# Patient Record
Sex: FEMALE | Race: WHITE | NOT HISPANIC OR LATINO | URBAN - METROPOLITAN AREA
[De-identification: names, ages, dates, MRNs, and addresses within clinical notes are randomized per-mention and may not be internally consistent; named-entity substitution may affect disease eponyms.]

---

## 2021-10-13 ENCOUNTER — SPOT (OUTPATIENT)
Dept: URBAN - METROPOLITAN AREA CLINIC 38 | Facility: CLINIC | Age: 86
Setting detail: DERMATOLOGY
End: 2021-10-13

## 2021-10-13 DIAGNOSIS — D23.5 OTHER BENIGN NEOPLASM OF SKIN OF TRUNK: ICD-10-CM

## 2021-10-13 DIAGNOSIS — L82.1 OTHER SEBORRHEIC KERATOSIS: ICD-10-CM

## 2021-10-13 DIAGNOSIS — L57.8 OTHER SKIN CHANGES DUE TO CHRONIC EXPOSURE TO NONIONIZING RADIATION: ICD-10-CM

## 2021-10-13 PROBLEM — L82.0 INFLAMED SEBORRHEIC KERATOSIS: Status: RESOLVED | Noted: 2021-10-13

## 2021-10-13 PROBLEM — F42.4 EXCORIATION (SKIN-PICKING) DISORDER: Status: RESOLVED | Noted: 2021-10-13

## 2021-10-13 PROCEDURE — 99203 OFFICE O/P NEW LOW 30 MIN: CPT

## 2021-10-13 PROCEDURE — 17110 DESTRUCT B9 LESION 1-14: CPT

## 2022-01-20 ENCOUNTER — SPOT (OUTPATIENT)
Dept: URBAN - METROPOLITAN AREA CLINIC 38 | Facility: CLINIC | Age: 87
Setting detail: DERMATOLOGY
End: 2022-01-20

## 2022-01-20 DIAGNOSIS — L57.0 ACTINIC KERATOSIS: ICD-10-CM

## 2022-01-20 PROBLEM — L28.0 LICHEN SIMPLEX CHRONICUS: Status: RESOLVED | Noted: 2022-01-20

## 2022-01-20 PROBLEM — L82.0 INFLAMED SEBORRHEIC KERATOSIS: Status: RESOLVED | Noted: 2022-01-20

## 2022-01-20 PROBLEM — L82.1 OTHER SEBORRHEIC KERATOSIS: Status: RESOLVED | Noted: 2022-01-20

## 2022-01-20 PROCEDURE — 17110 DESTRUCT B9 LESION 1-14: CPT

## 2022-01-20 PROCEDURE — 99213 OFFICE O/P EST LOW 20 MIN: CPT

## 2024-08-26 ENCOUNTER — NURSING HOME VISIT (OUTPATIENT)
Dept: POST ACUTE CARE | Facility: EXTERNAL LOCATION | Age: 89
End: 2024-08-26

## 2024-08-26 DIAGNOSIS — F02.80 ALZHEIMER DISEASE (MULTI): Primary | ICD-10-CM

## 2024-08-26 DIAGNOSIS — G30.9 ALZHEIMER DISEASE (MULTI): Primary | ICD-10-CM

## 2024-08-26 NOTE — LETTER
Patient: Nitza Barton  : 6/10/1932    Encounter Date: 2024    Pt was seen in the NH.  89 yo f with PMH alzhermers disease depression hypothyroidism HTN CKD here for long term care  Pt is in usual state , no complaint  General appearance: Comfortable, no distress  ROS: No SOB  Medications reviewed  Head: Normal  Neck: Soft  Heart: Regular  Lungs: Clear  Abdomen: soft    Plan:   1)comfort care  2) To continue lorazepam 0.5 mg q 2 h prn    Problem List Items Addressed This Visit       Alzheimer disease (Multi) - Primary          Electronically Signed By: Johnathan Delatorre MD   24  5:17 PM

## 2024-08-26 NOTE — PROGRESS NOTES
Pt was seen in the NH.  89 yo f with PMH alzhermers disease depression hypothyroidism HTN CKD here for long term care  Pt is in usual state , no complaint  General appearance: Comfortable, no distress  ROS: No SOB  Medications reviewed  Head: Normal  Neck: Soft  Heart: Regular  Lungs: Clear  Abdomen: soft    Plan:   1)comfort care  2) To continue lorazepam 0.5 mg q 2 h prn    Problem List Items Addressed This Visit       Alzheimer disease (Multi) - Primary

## 2024-09-25 ENCOUNTER — NURSING HOME VISIT (OUTPATIENT)
Dept: POST ACUTE CARE | Facility: EXTERNAL LOCATION | Age: 89
End: 2024-09-25

## 2024-09-25 DIAGNOSIS — E03.9 HYPOTHYROIDISM, UNSPECIFIED TYPE: Primary | ICD-10-CM

## 2024-09-25 NOTE — LETTER
Patient: Nitza Barton  : 6/10/1932    Encounter Date: 2024    Pt was seen in the NH.  Pt is in usual state , no complaint  General appearance: Comfortable, no distress  ROS: No SOB  Medications reviewed  Head: Normal  Neck: Soft  Heart: Regular  Lungs: Clear  Abdomen: soft    Plan:   1)clinically doing fine  2) To continue levothyroxine 175 mcg daily    Problem List Items Addressed This Visit       Hypothyroidism - Primary          Electronically Signed By: Johnathan Delatorre MD   24  8:56 PM

## 2024-09-26 NOTE — PROGRESS NOTES
Pt was seen in the NH.  Pt is in usual state , no complaint  General appearance: Comfortable, no distress  ROS: No SOB  Medications reviewed  Head: Normal  Neck: Soft  Heart: Regular  Lungs: Clear  Abdomen: soft    Plan:   1)clinically doing fine  2) To continue levothyroxine 175 mcg daily    Problem List Items Addressed This Visit       Hypothyroidism - Primary

## 2024-10-25 ENCOUNTER — LAB REQUISITION (OUTPATIENT)
Dept: LAB | Facility: HOSPITAL | Age: 89
End: 2024-10-25
Payer: MEDICARE

## 2024-10-25 DIAGNOSIS — E03.9 HYPOTHYROIDISM, UNSPECIFIED: ICD-10-CM

## 2024-10-25 LAB
T4 FREE SERPL-MCNC: 1.03 NG/DL (ref 0.61–1.12)
TSH SERPL-ACNC: 29.35 MIU/L (ref 0.44–3.98)

## 2024-10-25 PROCEDURE — 36415 COLL VENOUS BLD VENIPUNCTURE: CPT | Mod: OUT | Performed by: INTERNAL MEDICINE

## 2024-10-25 PROCEDURE — 84439 ASSAY OF FREE THYROXINE: CPT | Mod: OUT | Performed by: INTERNAL MEDICINE

## 2024-10-25 PROCEDURE — 84443 ASSAY THYROID STIM HORMONE: CPT | Mod: OUT | Performed by: INTERNAL MEDICINE

## 2024-10-28 ENCOUNTER — NURSING HOME VISIT (OUTPATIENT)
Dept: POST ACUTE CARE | Facility: EXTERNAL LOCATION | Age: 89
End: 2024-10-28
Payer: MEDICARE

## 2024-10-28 DIAGNOSIS — I10 HYPERTENSION, UNSPECIFIED TYPE: Primary | ICD-10-CM

## 2024-10-28 PROCEDURE — 99308 SBSQ NF CARE LOW MDM 20: CPT | Performed by: INTERNAL MEDICINE

## 2024-11-25 ENCOUNTER — NURSING HOME VISIT (OUTPATIENT)
Dept: POST ACUTE CARE | Facility: EXTERNAL LOCATION | Age: 89
End: 2024-11-25
Payer: MEDICARE

## 2024-11-25 DIAGNOSIS — G30.9 ALZHEIMER DISEASE (MULTI): Primary | ICD-10-CM

## 2024-11-25 DIAGNOSIS — F02.80 ALZHEIMER DISEASE (MULTI): Primary | ICD-10-CM

## 2024-11-25 PROCEDURE — 99308 SBSQ NF CARE LOW MDM 20: CPT | Performed by: INTERNAL MEDICINE

## 2024-11-25 NOTE — PROGRESS NOTES
Pt was seen in the NH.  Pt is in usual state , no complaint  General appearance: Comfortable, no distress  ROS: No SOB  Medications reviewed  Head: Normal  Neck: Soft  Heart: Regular  Lungs: Clear  Abdomen: soft    Plan:   1)clinically doing fine  2) To continue seroquel 100 mg daily    Problem List Items Addressed This Visit       Alzheimer disease (Multi) - Primary

## 2024-11-25 NOTE — LETTER
Patient: Nitza Barton  : 6/10/1932    Encounter Date: 2024    Pt was seen in the NH.  Pt is in usual state , no complaint  General appearance: Comfortable, no distress  ROS: No SOB  Medications reviewed  Head: Normal  Neck: Soft  Heart: Regular  Lungs: Clear  Abdomen: soft    Plan:   1)clinically doing fine  2) To continue seroquel 100 mg daily    Problem List Items Addressed This Visit       Alzheimer disease (Multi) - Primary          Electronically Signed By: Johnathan Delatorre MD   24  4:09 PM

## 2024-12-10 ENCOUNTER — NURSING HOME VISIT (OUTPATIENT)
Dept: POST ACUTE CARE | Facility: EXTERNAL LOCATION | Age: 89
End: 2024-12-10
Payer: MEDICARE

## 2024-12-10 DIAGNOSIS — E03.9 HYPOTHYROIDISM, UNSPECIFIED TYPE: Primary | ICD-10-CM

## 2024-12-10 PROCEDURE — 99308 SBSQ NF CARE LOW MDM 20: CPT | Performed by: INTERNAL MEDICINE

## 2024-12-10 NOTE — LETTER
Patient: Nitza Barton  : 6/10/1932    Encounter Date: 12/10/2024    Pt was seen in the NH.  Pt is in usual state , no complaint  General appearance: Comfortable, no distress  ROS: No SOB  Medications reviewed  Head: Normal  Neck: Soft  Heart: Regular  Lungs: Clear  Abdomen: soft    Plan:   1)clinically doing fine  2) To continue levothyroxine 175 mcg daily    Problem List Items Addressed This Visit       Hypothyroidism - Primary          Electronically Signed By: Johnathan Delatorre MD   12/10/24  7:32 PM

## 2024-12-31 ENCOUNTER — LAB REQUISITION (OUTPATIENT)
Dept: LAB | Facility: HOSPITAL | Age: 89
End: 2024-12-31
Payer: MEDICARE

## 2024-12-31 DIAGNOSIS — I12.9 HYPERTENSIVE CHRONIC KIDNEY DISEASE WITH STAGE 1 THROUGH STAGE 4 CHRONIC KIDNEY DISEASE, OR UNSPECIFIED CHRONIC KIDNEY DISEASE: ICD-10-CM

## 2024-12-31 DIAGNOSIS — E03.9 HYPOTHYROIDISM, UNSPECIFIED: ICD-10-CM

## 2024-12-31 LAB
T4 FREE SERPL-MCNC: 0.77 NG/DL (ref 0.61–1.12)
TSH SERPL-ACNC: 9.62 MIU/L (ref 0.44–3.98)

## 2024-12-31 PROCEDURE — 84439 ASSAY OF FREE THYROXINE: CPT | Mod: OUT | Performed by: INTERNAL MEDICINE

## 2024-12-31 PROCEDURE — 84443 ASSAY THYROID STIM HORMONE: CPT | Mod: OUT | Performed by: INTERNAL MEDICINE

## 2024-12-31 PROCEDURE — 36415 COLL VENOUS BLD VENIPUNCTURE: CPT | Mod: OUT | Performed by: INTERNAL MEDICINE

## 2025-01-10 ENCOUNTER — LAB REQUISITION (OUTPATIENT)
Dept: LAB | Facility: HOSPITAL | Age: OVER 89
End: 2025-01-10
Payer: MEDICARE

## 2025-01-10 DIAGNOSIS — E03.9 HYPOTHYROIDISM, UNSPECIFIED: ICD-10-CM

## 2025-01-10 LAB
T4 FREE SERPL-MCNC: 1.04 NG/DL (ref 0.61–1.12)
TSH SERPL-ACNC: 13.46 MIU/L (ref 0.44–3.98)

## 2025-01-10 PROCEDURE — 36415 COLL VENOUS BLD VENIPUNCTURE: CPT | Mod: OUT | Performed by: INTERNAL MEDICINE

## 2025-01-10 PROCEDURE — 84443 ASSAY THYROID STIM HORMONE: CPT | Mod: OUT | Performed by: INTERNAL MEDICINE

## 2025-01-10 PROCEDURE — 84439 ASSAY OF FREE THYROXINE: CPT | Mod: OUT | Performed by: INTERNAL MEDICINE

## 2025-01-27 ENCOUNTER — NURSING HOME VISIT (OUTPATIENT)
Dept: POST ACUTE CARE | Facility: EXTERNAL LOCATION | Age: OVER 89
End: 2025-01-27
Payer: MEDICARE

## 2025-01-27 DIAGNOSIS — F02.80 ALZHEIMER DISEASE (MULTI): ICD-10-CM

## 2025-01-27 DIAGNOSIS — G30.9 ALZHEIMER DISEASE (MULTI): ICD-10-CM

## 2025-01-27 PROCEDURE — 99308 SBSQ NF CARE LOW MDM 20: CPT | Performed by: INTERNAL MEDICINE

## 2025-01-27 NOTE — PROGRESS NOTES
Pt was seen in the NH.  Pt is in usual state , no complaint  General appearance: Comfortable, no distress  ROS: No SOB  Medications reviewed  Head: Normal  Neck: Soft  Heart: Regular  Lungs: Clear  Abdomen: soft    Plan:   1)clinically doing fine  2) To continue seroquel 100 mg daily    Problem List Items Addressed This Visit       Alzheimer disease (Multi)

## 2025-01-27 NOTE — LETTER
Patient: Nitza Barton  : 6/10/1932    Encounter Date: 2025    Pt was seen in the NH.  Pt is in usual state , no complaint  General appearance: Comfortable, no distress  ROS: No SOB  Medications reviewed  Head: Normal  Neck: Soft  Heart: Regular  Lungs: Clear  Abdomen: soft    Plan:   1)clinically doing fine  2) To continue seroquel 100 mg daily    Problem List Items Addressed This Visit       Alzheimer disease (Multi)          Electronically Signed By: Johnathan Delatorre MD   25  5:28 PM

## 2025-01-28 ENCOUNTER — LAB REQUISITION (OUTPATIENT)
Dept: LAB | Facility: HOSPITAL | Age: OVER 89
End: 2025-01-28
Payer: MEDICARE

## 2025-01-28 DIAGNOSIS — E03.9 HYPOTHYROIDISM, UNSPECIFIED: ICD-10-CM

## 2025-01-28 LAB
T4 FREE SERPL-MCNC: 0.99 NG/DL (ref 0.61–1.12)
TSH SERPL-ACNC: 4.03 MIU/L (ref 0.44–3.98)

## 2025-01-28 PROCEDURE — 84443 ASSAY THYROID STIM HORMONE: CPT | Mod: OUT | Performed by: INTERNAL MEDICINE

## 2025-01-28 PROCEDURE — 36415 COLL VENOUS BLD VENIPUNCTURE: CPT | Mod: OUT | Performed by: INTERNAL MEDICINE

## 2025-01-28 PROCEDURE — 84439 ASSAY OF FREE THYROXINE: CPT | Mod: OUT | Performed by: INTERNAL MEDICINE

## 2025-02-21 ENCOUNTER — LAB REQUISITION (OUTPATIENT)
Dept: LAB | Facility: HOSPITAL | Age: OVER 89
End: 2025-02-21
Payer: MEDICARE

## 2025-02-21 DIAGNOSIS — E03.9 HYPOTHYROIDISM, UNSPECIFIED: ICD-10-CM

## 2025-02-21 DIAGNOSIS — F41.9 ANXIETY DISORDER, UNSPECIFIED: ICD-10-CM

## 2025-02-21 LAB
T4 FREE SERPL-MCNC: 1.02 NG/DL (ref 0.61–1.12)
TSH SERPL-ACNC: 4.13 MIU/L (ref 0.44–3.98)

## 2025-02-21 PROCEDURE — 36415 COLL VENOUS BLD VENIPUNCTURE: CPT | Mod: OUT | Performed by: INTERNAL MEDICINE

## 2025-02-21 PROCEDURE — 84439 ASSAY OF FREE THYROXINE: CPT | Mod: OUT | Performed by: INTERNAL MEDICINE

## 2025-02-21 PROCEDURE — 84443 ASSAY THYROID STIM HORMONE: CPT | Mod: OUT | Performed by: INTERNAL MEDICINE

## 2025-02-24 ENCOUNTER — NURSING HOME VISIT (OUTPATIENT)
Dept: POST ACUTE CARE | Facility: EXTERNAL LOCATION | Age: OVER 89
End: 2025-02-24
Payer: MEDICARE

## 2025-02-24 DIAGNOSIS — E03.9 HYPOTHYROIDISM, UNSPECIFIED TYPE: Primary | ICD-10-CM

## 2025-02-24 PROCEDURE — 99308 SBSQ NF CARE LOW MDM 20: CPT | Performed by: INTERNAL MEDICINE

## 2025-02-24 NOTE — LETTER
Patient: Nitza Barton  : 6/10/1932    Encounter Date: 2025    Pt was seen in the NH.  Pt is in usual state , no complaint  General appearance: Comfortable, no distress  ROS: No SOB  Medications reviewed  Head: Normal  Neck: Soft  Heart: Regular  Lungs: Clear  Abdomen: soft    Plan:   1)clinically doing fine  2) To continue levothyroxine 175 mcg daily    Problem List Items Addressed This Visit       Hypothyroidism - Primary          Electronically Signed By: Johnathan Delatorre MD   25  4:44 PM

## 2025-03-12 ENCOUNTER — LAB REQUISITION (OUTPATIENT)
Dept: LAB | Facility: HOSPITAL | Age: OVER 89
End: 2025-03-12
Payer: MEDICARE

## 2025-03-12 DIAGNOSIS — E03.9 HYPOTHYROIDISM, UNSPECIFIED: ICD-10-CM

## 2025-03-12 LAB
T4 FREE SERPL-MCNC: 1.22 NG/DL (ref 0.61–1.12)
TSH SERPL-ACNC: 1.45 MIU/L (ref 0.44–3.98)

## 2025-03-12 PROCEDURE — 84443 ASSAY THYROID STIM HORMONE: CPT | Mod: OUT | Performed by: INTERNAL MEDICINE

## 2025-03-12 PROCEDURE — 36415 COLL VENOUS BLD VENIPUNCTURE: CPT | Mod: OUT | Performed by: INTERNAL MEDICINE

## 2025-03-12 PROCEDURE — 84439 ASSAY OF FREE THYROXINE: CPT | Mod: OUT | Performed by: INTERNAL MEDICINE

## 2025-03-31 ENCOUNTER — NURSING HOME VISIT (OUTPATIENT)
Dept: POST ACUTE CARE | Facility: EXTERNAL LOCATION | Age: OVER 89
End: 2025-03-31
Payer: MEDICARE

## 2025-03-31 DIAGNOSIS — I10 HYPERTENSION, UNSPECIFIED TYPE: Primary | ICD-10-CM

## 2025-03-31 PROCEDURE — 99308 SBSQ NF CARE LOW MDM 20: CPT | Performed by: INTERNAL MEDICINE

## 2025-03-31 NOTE — PROGRESS NOTES
Pt was seen in the NH.  Pt is in usual state , no complaint  General appearance: Comfortable, no distress  ROS: No SOB  Medications reviewed  Head: Normal  Neck: Soft  Heart: Regular  Lungs: Clear  Abdomen: soft    Plan:   1)clinically doing fine  2) To continue lisinopril 5 mg daily    Problem List Items Addressed This Visit       Hypertension - Primary

## 2025-03-31 NOTE — LETTER
Patient: Nitza Barton  : 6/10/1932    Encounter Date: 2025    Pt was seen in the NH.  Pt is in usual state , no complaint  General appearance: Comfortable, no distress  ROS: No SOB  Medications reviewed  Head: Normal  Neck: Soft  Heart: Regular  Lungs: Clear  Abdomen: soft    Plan:   1)clinically doing fine  2) To continue lisinopril 5 mg daily    Problem List Items Addressed This Visit       Hypertension - Primary          Electronically Signed By: Johnathan Delatorre MD   3/31/25  3:56 PM

## 2025-04-04 ENCOUNTER — LAB REQUISITION (OUTPATIENT)
Dept: LAB | Facility: HOSPITAL | Age: OVER 89
End: 2025-04-04
Payer: MEDICARE

## 2025-04-04 DIAGNOSIS — E03.9 HYPOTHYROIDISM, UNSPECIFIED: ICD-10-CM

## 2025-04-04 LAB
T4 FREE SERPL-MCNC: 1.19 NG/DL (ref 0.61–1.12)
TSH SERPL-ACNC: 1.19 MIU/L (ref 0.44–3.98)

## 2025-04-04 PROCEDURE — 84439 ASSAY OF FREE THYROXINE: CPT | Mod: OUT | Performed by: INTERNAL MEDICINE

## 2025-04-04 PROCEDURE — 36415 COLL VENOUS BLD VENIPUNCTURE: CPT | Mod: OUT | Performed by: INTERNAL MEDICINE

## 2025-04-04 PROCEDURE — 84443 ASSAY THYROID STIM HORMONE: CPT | Mod: OUT | Performed by: INTERNAL MEDICINE

## 2025-04-28 ENCOUNTER — NURSING HOME VISIT (OUTPATIENT)
Dept: POST ACUTE CARE | Facility: EXTERNAL LOCATION | Age: OVER 89
End: 2025-04-28
Payer: MEDICARE

## 2025-04-28 DIAGNOSIS — G30.9 ALZHEIMER DISEASE (MULTI): Primary | ICD-10-CM

## 2025-04-28 DIAGNOSIS — F02.80 ALZHEIMER DISEASE (MULTI): Primary | ICD-10-CM

## 2025-04-28 PROCEDURE — 99308 SBSQ NF CARE LOW MDM 20: CPT | Performed by: INTERNAL MEDICINE

## 2025-04-28 NOTE — LETTER
Patient: Nitza Barton  : 6/10/1932    Encounter Date: 2025    Pt was seen in the NH.  Pt is in usual state , no complaint  General appearance: Comfortable, no distress  ROS: No SOB  Medications reviewed  Head: Normal  Neck: Soft  Heart: Regular  Lungs: Clear  Abdomen: soft    Plan:   1)clinically doing fine  2) To continue seroquel 100 mg daily    Problem List Items Addressed This Visit       Alzheimer disease (Multi) - Primary          Electronically Signed By: Johnathan Delatorre MD   25  8:45 PM

## 2025-05-20 ENCOUNTER — LAB REQUISITION (OUTPATIENT)
Dept: LAB | Facility: HOSPITAL | Age: OVER 89
End: 2025-05-20
Payer: MEDICARE

## 2025-05-20 DIAGNOSIS — G30.1 ALZHEIMER'S DISEASE WITH LATE ONSET (CODE): ICD-10-CM

## 2025-05-20 DIAGNOSIS — E03.9 HYPOTHYROIDISM, UNSPECIFIED: ICD-10-CM

## 2025-05-20 LAB
T4 FREE SERPL-MCNC: 0.88 NG/DL (ref 0.61–1.12)
TSH SERPL-ACNC: 0.8 MIU/L (ref 0.44–3.98)

## 2025-05-20 PROCEDURE — 84443 ASSAY THYROID STIM HORMONE: CPT | Mod: OUT | Performed by: INTERNAL MEDICINE

## 2025-05-20 PROCEDURE — 84439 ASSAY OF FREE THYROXINE: CPT | Mod: OUT | Performed by: INTERNAL MEDICINE

## 2025-05-20 PROCEDURE — 36415 COLL VENOUS BLD VENIPUNCTURE: CPT | Mod: OUT | Performed by: INTERNAL MEDICINE

## 2025-05-21 ENCOUNTER — NURSING HOME VISIT (OUTPATIENT)
Dept: POST ACUTE CARE | Facility: EXTERNAL LOCATION | Age: OVER 89
End: 2025-05-21
Payer: MEDICARE

## 2025-05-21 ENCOUNTER — LAB REQUISITION (OUTPATIENT)
Dept: LAB | Facility: HOSPITAL | Age: OVER 89
End: 2025-05-21
Payer: MEDICARE

## 2025-05-21 DIAGNOSIS — G30.9 ALZHEIMER'S DISEASE, UNSPECIFIED (CODE): ICD-10-CM

## 2025-05-21 DIAGNOSIS — M13.0 POLYARTHRITIS, UNSPECIFIED: ICD-10-CM

## 2025-05-21 DIAGNOSIS — I12.9 HYPERTENSIVE CHRONIC KIDNEY DISEASE WITH STAGE 1 THROUGH STAGE 4 CHRONIC KIDNEY DISEASE, OR UNSPECIFIED CHRONIC KIDNEY DISEASE: ICD-10-CM

## 2025-05-21 DIAGNOSIS — G30.1 ALZHEIMER'S DISEASE WITH LATE ONSET (CODE): ICD-10-CM

## 2025-05-21 PROCEDURE — 99308 SBSQ NF CARE LOW MDM 20: CPT | Performed by: INTERNAL MEDICINE

## 2025-05-21 NOTE — LETTER
Patient: Nitza Barton  : 6/10/1932    Encounter Date: 2025    Pt was seen in the NH.  Pt is in usual state , no complaint  General appearance: Comfortable, no distress  ROS: No SOB  Medications reviewed  Head: Normal  Neck: Soft  Heart: Regular  Lungs: Clear  Abdomen: soft    Plan:   1)clinically doing fine  2) To continue seroquel 100 mg hs    Problem List Items Addressed This Visit    None  Visit Diagnoses         Alzheimer's disease, unspecified (CODE)                   Electronically Signed By: Johnathan Delatorre MD   25  8:50 AM

## 2025-05-21 NOTE — PROGRESS NOTES
Pt was seen in the NH.  Pt is in usual state , no complaint  General appearance: Comfortable, no distress  ROS: No SOB  Medications reviewed  Head: Normal  Neck: Soft  Heart: Regular  Lungs: Clear  Abdomen: soft    Plan:   1)clinically doing fine  2) To continue seroquel 100 mg hs    Problem List Items Addressed This Visit    None  Visit Diagnoses         Alzheimer's disease, unspecified (CODE)

## 2025-06-23 ENCOUNTER — NURSING HOME VISIT (OUTPATIENT)
Dept: POST ACUTE CARE | Facility: EXTERNAL LOCATION | Age: OVER 89
End: 2025-06-23
Payer: MEDICARE

## 2025-06-23 DIAGNOSIS — F02.80 ALZHEIMER DISEASE (MULTI): ICD-10-CM

## 2025-06-23 DIAGNOSIS — E03.9 HYPOTHYROIDISM, UNSPECIFIED TYPE: Primary | ICD-10-CM

## 2025-06-23 DIAGNOSIS — G30.9 ALZHEIMER DISEASE (MULTI): ICD-10-CM

## 2025-06-23 PROCEDURE — 99308 SBSQ NF CARE LOW MDM 20: CPT | Performed by: INTERNAL MEDICINE

## 2025-06-23 NOTE — PROGRESS NOTES
Pt was seen in the NH.  Pt is in usual state , no complaint  General appearance: Comfortable, no distress  ROS: No SOB  Medications reviewed  Head: Normal  Neck: Soft  Heart: Regular  Lungs: Clear  Abdomen: soft    Plan:   1)clinically doing fine  2) To continue levothyroxine 175 mcg daily    Problem List Items Addressed This Visit       Alzheimer disease (Multi)    Hypothyroidism - Primary

## 2025-06-23 NOTE — LETTER
Patient: Nitza Barton  : 6/10/1932    Encounter Date: 2025    Pt was seen in the NH.  Pt is in usual state , no complaint  General appearance: Comfortable, no distress  ROS: No SOB  Medications reviewed  Head: Normal  Neck: Soft  Heart: Regular  Lungs: Clear  Abdomen: soft    Plan:   1)clinically doing fine  2) To continue levothyroxine 175 mcg daily    Problem List Items Addressed This Visit       Alzheimer disease (Multi)    Hypothyroidism - Primary          Electronically Signed By: Johnathan Delatorre MD   25  4:01 PM

## 2025-06-27 ENCOUNTER — LAB REQUISITION (OUTPATIENT)
Dept: LAB | Facility: HOSPITAL | Age: OVER 89
End: 2025-06-27
Payer: MEDICARE

## 2025-06-27 DIAGNOSIS — E03.9 HYPOTHYROIDISM, UNSPECIFIED: ICD-10-CM

## 2025-06-27 LAB
T4 FREE SERPL-MCNC: 1.05 NG/DL (ref 0.61–1.12)
TSH SERPL-ACNC: 1.59 MIU/L (ref 0.44–3.98)

## 2025-06-27 PROCEDURE — 84443 ASSAY THYROID STIM HORMONE: CPT | Mod: OUT | Performed by: INTERNAL MEDICINE

## 2025-06-27 PROCEDURE — 84439 ASSAY OF FREE THYROXINE: CPT | Mod: OUT | Performed by: INTERNAL MEDICINE

## 2025-06-27 PROCEDURE — 36415 COLL VENOUS BLD VENIPUNCTURE: CPT | Mod: OUT | Performed by: INTERNAL MEDICINE

## 2025-07-30 ENCOUNTER — NURSING HOME VISIT (OUTPATIENT)
Dept: POST ACUTE CARE | Facility: EXTERNAL LOCATION | Age: OVER 89
End: 2025-07-30
Payer: MEDICARE

## 2025-07-30 DIAGNOSIS — G30.9 ALZHEIMER DISEASE (MULTI): Primary | ICD-10-CM

## 2025-07-30 DIAGNOSIS — F02.80 ALZHEIMER DISEASE (MULTI): Primary | ICD-10-CM

## 2025-07-30 PROCEDURE — 99308 SBSQ NF CARE LOW MDM 20: CPT | Performed by: INTERNAL MEDICINE

## 2025-07-30 NOTE — PROGRESS NOTES
Pt was seen in the NH.  Pt gets agitated with care  General appearance: Comfortable, no distress  ROS: No SOB  Medications reviewed  Head: Normal  Neck: Soft  Heart: Regular  Lungs: Clear  Abdomen: soft    Plan:     Start Oxycodone 5 mg bid and continue prn    Problem List Items Addressed This Visit       Alzheimer disease (Multi) - Primary

## 2025-07-30 NOTE — LETTER
Patient: Nitza Barton  : 6/10/1932    Encounter Date: 2025    Pt was seen in the NH.  Pt gets agitated with care  General appearance: Comfortable, no distress  ROS: No SOB  Medications reviewed  Head: Normal  Neck: Soft  Heart: Regular  Lungs: Clear  Abdomen: soft    Plan:     Start Oxycodone 5 mg bid and continue prn    Problem List Items Addressed This Visit       Alzheimer disease (Multi) - Primary          Electronically Signed By: Johnathan Delatorre MD   25  4:47 PM